# Patient Record
Sex: FEMALE | Race: WHITE | NOT HISPANIC OR LATINO | URBAN - METROPOLITAN AREA
[De-identification: names, ages, dates, MRNs, and addresses within clinical notes are randomized per-mention and may not be internally consistent; named-entity substitution may affect disease eponyms.]

---

## 2020-07-25 ENCOUNTER — TELEPHONE ENCOUNTER (OUTPATIENT)
Dept: URBAN - METROPOLITAN AREA CLINIC 13 | Facility: CLINIC | Age: 35
End: 2020-07-25

## 2020-07-25 RX ORDER — PREDNISONE 10 MG/1
TAKE 2 TABLET DAILY TABLET ORAL
Qty: 60 | Refills: 0 | OUTPATIENT
Start: 2014-05-22 | End: 2014-06-17

## 2020-07-25 RX ORDER — CERTOLIZUMAB PEGOL 200 MG/ML
RECONSTITUTE (LYOPHILIZED POWDER) AND INJECT 400MG  SUBQUTANEOUSLY AT WEEK 0, WEEK 2, AND WEEK 4 INJECTION, SOLUTION SUBCUTANEOUS
Qty: 1 | Refills: 0 | OUTPATIENT
Start: 2012-02-17 | End: 2012-03-30

## 2020-07-25 RX ORDER — CHOLESTYRAMINE 4 G/9G
TAKE 1 PACKET TWICE DAILY BEFORE MEALS POWDER, FOR SUSPENSION ORAL
Qty: 1 | Refills: 2 | OUTPATIENT
Start: 2011-10-26 | End: 2012-01-24

## 2020-07-25 RX ORDER — ALPRAZOLAM 0.5 MG/1
TAKE 1 TABLET 3 TIMES DAILY AS NEEDED TABLET ORAL
Qty: 30 | Refills: 0 | OUTPATIENT
Start: 2012-01-24 | End: 2012-05-11

## 2020-07-25 RX ORDER — POLYETHYLENE GLYCOL 3350, SODIUM SULFATE, SODIUM CHLORIDE, POTASSIUM CHLORIDE, ASCORBIC ACID, SODIUM ASCORBATE 7.5-2.691G
USE AS DIRECTED KIT ORAL
Qty: 1 | Refills: 0 | OUTPATIENT
Start: 2012-12-04 | End: 2013-02-11

## 2020-07-25 RX ORDER — METRONIDAZOLE 500 MG/1
TAKE 1 TABLET 3 TIMES DAILY FOR 14 DAYS TABLET ORAL
Qty: 42 | Refills: 0 | OUTPATIENT
Start: 2013-12-04 | End: 2014-01-27

## 2020-07-25 RX ORDER — PREDNISONE 10 MG/1
TAKE 2 TABLET DAILY TABLET ORAL
Qty: 60 | Refills: 2 | OUTPATIENT
Start: 2014-01-27 | End: 2014-03-31

## 2020-07-25 RX ORDER — METRONIDAZOLE 500 MG/1
TAKE 1 TABLET 3 TIMES DAILY FOR 10 DAYS TABLET ORAL
Qty: 30 | Refills: 0 | OUTPATIENT
Start: 2014-04-02 | End: 2014-06-17

## 2020-07-25 RX ORDER — AZATHIOPRINE 50 1/1
TAKE 1 TABLET DAILY INCREASE TO 2 TABLETS DAILY WHEN INSTRUCTED TABLET ORAL
Qty: 60 | Refills: 1 | OUTPATIENT
Start: 2013-02-11 | End: 2013-04-26

## 2020-07-25 RX ORDER — CYANOCOBALAMIN 1000 UG/ML
INJECT 1ML WEEKLY FOR 4 WEEKS, THEN 1ML ONCE MONTHLY INJECTION INTRAMUSCULAR; SUBCUTANEOUS
Qty: 4 | Refills: 6 | OUTPATIENT
Start: 2011-10-27 | End: 2013-07-22

## 2020-07-25 RX ORDER — POLYETHYLENE GLYCOL 3350, SODIUM SULFATE, SODIUM CHLORIDE, POTASSIUM CHLORIDE, ASCORBIC ACID, SODIUM ASCORBATE 7.5-2.691G
TAKE 32 OZ AS DIRECTED 5:00PM THE EVENING BEFORE AND 6HR PRIOR TO PROCEDURE KIT ORAL
Qty: 1 | Refills: 0 | OUTPATIENT
Start: 2014-07-24 | End: 2014-09-19

## 2020-07-25 RX ORDER — CIPROFLOXACIN HYDROCHLORIDE 500 MG/1
TAKE 1 TABLET WEEKLY TABLET, FILM COATED ORAL
Refills: 0 | OUTPATIENT
Start: 2008-09-26 | End: 2008-10-27

## 2020-07-25 RX ORDER — PRENATAL VIT NO.130/IRON/FOLIC 27MG-0.8MG
TAKE 1 TABLET DAILY TABLET ORAL
Refills: 0 | OUTPATIENT
Start: 2008-10-27 | End: 2010-09-14

## 2020-07-25 RX ORDER — METRONIDAZOLE 500 MG/1
TAKE 1 TABLET 3 TIMES DAILY FOR 10 DAYS TABLET ORAL
Qty: 30 | Refills: 0 | OUTPATIENT
Start: 2014-09-19 | End: 2014-10-06

## 2020-07-25 RX ORDER — CHLORHEXIDINE GLUCONATE 4 %
TAKE 1 TABLET 3 TIMES DAILY LIQUID (ML) TOPICAL
Qty: 90 | Refills: 0 | OUTPATIENT
Start: 2012-11-26 | End: 2012-12-04

## 2020-07-25 RX ORDER — CYANOCOBALAMIN 1000 UG/ML
INJECT 1 ML INTRAMUSCULARLY WEEKLY X4 WEEKS THEN ONCE MONTHLY THEREAFTER INJECTION INTRAMUSCULAR; SUBCUTANEOUS
Qty: 4 | Refills: 6 | OUTPATIENT
Start: 2011-10-27

## 2020-07-25 RX ORDER — METRONIDAZOLE 500 MG/1
TAKE 1 TABLET 3 TIMES DAILY FOR 10 DAYS TABLET ORAL
Qty: 30 | Refills: 0 | OUTPATIENT
Start: 2012-12-04 | End: 2013-02-11

## 2020-07-25 RX ORDER — FOLIC ACID 1 MG/1
TAKE 1 TABLET DAILY TDD:1MG TABLET ORAL
Qty: 30 | Refills: 3 | OUTPATIENT
Start: 2011-10-27 | End: 2012-12-04

## 2020-07-25 RX ORDER — PREDNISONE 10 MG/1
TAKE 2 TABLET DAILY TABLET ORAL
Qty: 60 | Refills: 2 | OUTPATIENT
Start: 2014-06-17 | End: 2014-09-19

## 2020-07-25 RX ORDER — CERTOLIZUMAB PEGOL 200 MG/ML
INJECT 400MG SC AT WEEK 0, 2, AND 4  PFS INJECTION, SOLUTION SUBCUTANEOUS
Qty: 6 | Refills: 0 | OUTPATIENT
Start: 2013-06-11 | End: 2013-07-22

## 2020-07-25 RX ORDER — POTASSIUM CHLORIDE 20 MEQ/1
TAKE 2 TABLETS TODAY THEN 1 TABLET DAILY THEREAFTER TABLET, EXTENDED RELEASE ORAL
Qty: 30 | Refills: 0 | OUTPATIENT
Start: 2013-12-12 | End: 2014-10-06

## 2020-07-25 RX ORDER — HYOSCYAMINE SULFATE 0.12 MG/1
PLACE 1-2 TABLET EVERY 6 HOURS PRN ABD PAIN TABLET, ORALLY DISINTEGRATING ORAL
Qty: 120 | Refills: 1 | OUTPATIENT
Start: 2012-12-04 | End: 2012-12-19

## 2020-07-25 RX ORDER — PREDNISONE 10 MG/1
TAKE 3 TABLET DAILY TABLET ORAL
Qty: 90 | Refills: 0 | OUTPATIENT
Start: 2014-04-10 | End: 2014-05-22

## 2020-07-26 ENCOUNTER — TELEPHONE ENCOUNTER (OUTPATIENT)
Dept: URBAN - METROPOLITAN AREA CLINIC 13 | Facility: CLINIC | Age: 35
End: 2020-07-26

## 2020-07-26 RX ORDER — METRONIDAZOLE 500 MG/1
TAKE 1 TABLET 3 TIMES DAILY X 14 DAYS TABLET ORAL
Qty: 42 | Refills: 0 | Status: ACTIVE | COMMUNITY
Start: 2014-12-31

## 2020-07-26 RX ORDER — METRONIDAZOLE 500 MG/1
TAKE 1 TABLET 3 TIMES DAILY TABLET ORAL
Qty: 30 | Refills: 0 | Status: ACTIVE | COMMUNITY
Start: 2014-11-18

## 2020-07-26 RX ORDER — VEDOLIZUMAB 300 MG/5ML
INFUSE 300MG EVERY 8 WEEKS INJECTION, POWDER, LYOPHILIZED, FOR SOLUTION INTRAVENOUS
Refills: 0 | Status: ACTIVE | COMMUNITY
Start: 2014-09-10

## 2020-07-26 RX ORDER — OXYCODONE AND ACETAMINOPHEN 5; 325 MG/1; MG/1
TAKE 1 TO 2 TABLETS BY MOUTH EVERY 4 TO 6 HOURS AS NEEDED FOR SEVERE P TABLET ORAL
Qty: 60 | Refills: 0 | Status: ACTIVE | COMMUNITY
Start: 2011-09-06

## 2020-07-26 RX ORDER — CIPROFLOXACIN HYDROCHLORIDE 500 MG/1
TAKE 1 TABLET EVERY TWELVE HOURS TABLET, FILM COATED ORAL
Qty: 28 | Refills: 0 | Status: ACTIVE | COMMUNITY
Start: 2014-12-31

## 2020-07-26 RX ORDER — PREDNISONE 10 MG/1
TAKE 2 TABLET DAILY TABLET ORAL
Qty: 60 | Refills: 1 | Status: ACTIVE | COMMUNITY
Start: 2014-10-06

## 2022-10-09 ENCOUNTER — CLAIMS CREATED FROM THE CLAIM WINDOW (OUTPATIENT)
Dept: URBAN - METROPOLITAN AREA MEDICAL CENTER 19 | Facility: MEDICAL CENTER | Age: 37
End: 2022-10-09
Payer: COMMERCIAL

## 2022-10-09 DIAGNOSIS — R74.01 ALT (SGPT) LEVEL RAISED: ICD-10-CM

## 2022-10-09 DIAGNOSIS — R74.8 ABNORMAL ALKALINE PHOSPHATASE TEST: ICD-10-CM

## 2022-10-09 DIAGNOSIS — D64.89 NORMOCYTIC ANEMIA: ICD-10-CM

## 2022-10-09 DIAGNOSIS — K50.114 CROHN'S COLITIS, WITH ABSCESS: ICD-10-CM

## 2022-10-09 PROCEDURE — 99223 1ST HOSP IP/OBS HIGH 75: CPT | Performed by: INTERNAL MEDICINE

## 2022-10-09 PROCEDURE — 99222 1ST HOSP IP/OBS MODERATE 55: CPT | Performed by: INTERNAL MEDICINE

## 2022-10-25 ENCOUNTER — WEB ENCOUNTER (OUTPATIENT)
Dept: URBAN - METROPOLITAN AREA CLINIC 113 | Facility: CLINIC | Age: 37
End: 2022-10-25

## 2022-10-28 ENCOUNTER — OFFICE VISIT (OUTPATIENT)
Dept: URBAN - METROPOLITAN AREA CLINIC 107 | Facility: CLINIC | Age: 37
End: 2022-10-28

## 2022-11-16 ENCOUNTER — OFFICE VISIT (OUTPATIENT)
Dept: URBAN - METROPOLITAN AREA CLINIC 107 | Facility: CLINIC | Age: 37
End: 2022-11-16
Payer: COMMERCIAL

## 2022-11-16 VITALS
DIASTOLIC BLOOD PRESSURE: 78 MMHG | TEMPERATURE: 98.3 F | BODY MASS INDEX: 22.66 KG/M2 | WEIGHT: 136 LBS | HEART RATE: 82 BPM | HEIGHT: 65 IN | SYSTOLIC BLOOD PRESSURE: 117 MMHG | RESPIRATION RATE: 18 BRPM

## 2022-11-16 DIAGNOSIS — N82.3 RECTOVAGINAL FISTULA: ICD-10-CM

## 2022-11-16 DIAGNOSIS — K50.813 CROHN'S DISEASE OF BOTH SMALL AND LARGE INTESTINE WITH FISTULA: ICD-10-CM

## 2022-11-16 DIAGNOSIS — K61.1 PERIRECTAL ABSCESS: ICD-10-CM

## 2022-11-16 DIAGNOSIS — R74.8 ELEVATED LIVER ENZYMES: ICD-10-CM

## 2022-11-16 PROCEDURE — 99214 OFFICE O/P EST MOD 30 MIN: CPT | Performed by: NURSE PRACTITIONER

## 2022-11-16 RX ORDER — HYDROCODONE BITARTRATE AND ACETAMINOPHEN 5; 325 MG/1; MG/1
1 TABLET AS NEEDED TABLET ORAL
Status: ACTIVE | COMMUNITY

## 2022-11-16 RX ORDER — ONDANSETRON HYDROCHLORIDE 4 MG/1
1 TABLET TABLET, FILM COATED ORAL ONCE A DAY
Status: ACTIVE | COMMUNITY

## 2022-11-16 RX ORDER — USTEKINUMAB 90 MG/ML
AS DIRECTED INJECTION, SOLUTION SUBCUTANEOUS
Status: ACTIVE | COMMUNITY

## 2022-11-16 RX ORDER — PREDNISONE 10 MG/1
TAKE 2 TABLET DAILY TABLET ORAL
Qty: 60 | Refills: 1 | Status: ACTIVE | COMMUNITY
Start: 2014-10-06

## 2022-11-16 NOTE — HPI-TODAY'S VISIT:
She was seen in hospital consultation by Dr. Melendez on 10/9/2022 for evaluation of perianal pain secondary to recurrent perirectal abscess.  She had been started on Cipro and Flagyl outpatient, and was awaiting I&D later that afternoon.  Regarding elevated liver enzymes, she was instructed to hold azathioprine.  MRCP was recommended once recovered from surgery, due to concern for PSC.  A liver biopsy was considered.  She was recommended close follow-up with her gastroenterologist, Dr. Smart, upon discharge. She returns today to transfer her care back to our office. She has been following closely with Dr. Bergman since the time of hospital discharge secondary to suspected rectovaginal fistula. She underwent I&D of perirectal abscess with placement of seton drain on 10/9/22 by Dr. Jenkins while inpatient. Due to peristent pain and new vaginal drainage, she had EUA with abscess drainage on 10/28/22 by Dr. Bergman, with seton revision.  She reports little relief from abscess drainage and seton x2 to this point. She will undergo repeat EUA in about two weeks for reevaluation of the anus by Dr. Bergman. She remains on Stelara every 8 weeks and prednisone 5mg daily, but azathioprine has been held since the time of the hospitalization. Her bowel habits are regular and she denies blood per rectum. No abdominal pain. She had a MRI last week at [facility], ordered by Dr. Smart. She has an appointment scheduled with him later today to discuss the results, but is unsure if she will keep the appointment.

## 2022-11-16 NOTE — HPI-TODAY'S VISIT:
Ms. Manzano is a 37 year old woman with a history of stricturing ileocolonic Crohn's disease with perianal fistula status post ileocolectomy with ileosigmoid anastomosis (2011) on Stelara and azathioprine, recurrent perirectal fistulas/abscesses s/p multiple I&Ds, presenting for follow-up after hospitalization. She was previously followed in our office by Dr. Michelle and more recently has been managed by Dr. Smart.

## 2022-11-16 NOTE — HPI-OTHER HISTORIES
CT of the chest abdomen and pelvis with contrast on 11/4/2020 revealed a seton catheter along the left gluteal soft tissues.  Just anterior to the seton catheter is an additional fistulous tract beginning approximately 2:00 extending superiorly to the intersphincteric aspect of the left renal stent and also extending laterally to the posterior aspect of the left perineum as well as approximating the left vaginal wall without discrete intravaginal extension. CT of the abdomen and pelvis with contrast 10/8/2022:Left-sided rim-enhancing fluid collection measuring 3.2 x 3.4 x 3.8 cm concerning for perennial abscess. Per the hospital consult notes, last colonoscopy by Dr. Smart in 2021 showed significant colon inflammation. Colonoscopy August 2014 by Dr. Michelle:inactive perianal fistula/tags found on perianal exam, ileocolonic anastomosis with superficial ulceration, normal neoterminal ileum, colon erythema with small, clean-based rectal ulcerations, anal canal ulcer without stenosis.  Biopsies are not readily available within the chart. Colonoscopy (1/23/13) : fair prep, scattered area of mucosa in chandrika- terminal ileum with mild erythema and erosions, mucosal ulceration in proximal colon, distal rectal ulcerations, patent end-to-side ileo-colonic anastomosis in descending colon with erythema and ulceration. Abnormal perianal exam c/w fistula. Low grade active chronic inflammation with focal erosion on small intestinal biopsies. Severe active chronic colitis with ulceration and inflamed granulation tissue on colon and rectal bx.

## 2022-12-12 ENCOUNTER — OFFICE VISIT (OUTPATIENT)
Dept: URBAN - METROPOLITAN AREA CLINIC 107 | Facility: CLINIC | Age: 37
End: 2022-12-12

## 2023-02-01 ENCOUNTER — OFFICE VISIT (OUTPATIENT)
Dept: URBAN - METROPOLITAN AREA CLINIC 107 | Facility: CLINIC | Age: 38
End: 2023-02-01

## 2023-02-14 ENCOUNTER — WEB ENCOUNTER (OUTPATIENT)
Dept: URBAN - METROPOLITAN AREA CLINIC 107 | Facility: CLINIC | Age: 38
End: 2023-02-14

## 2023-02-14 ENCOUNTER — OFFICE VISIT (OUTPATIENT)
Dept: URBAN - METROPOLITAN AREA CLINIC 107 | Facility: CLINIC | Age: 38
End: 2023-02-14
Payer: COMMERCIAL

## 2023-02-14 VITALS
RESPIRATION RATE: 18 BRPM | TEMPERATURE: 96.9 F | DIASTOLIC BLOOD PRESSURE: 80 MMHG | BODY MASS INDEX: 22.33 KG/M2 | HEART RATE: 78 BPM | HEIGHT: 65 IN | SYSTOLIC BLOOD PRESSURE: 109 MMHG | WEIGHT: 134 LBS

## 2023-02-14 DIAGNOSIS — K61.1 PERIRECTAL ABSCESS: ICD-10-CM

## 2023-02-14 DIAGNOSIS — K50.813 CROHN'S DISEASE OF BOTH SMALL AND LARGE INTESTINE WITH FISTULA: ICD-10-CM

## 2023-02-14 DIAGNOSIS — N82.3 RECTOVAGINAL FISTULA: ICD-10-CM

## 2023-02-14 DIAGNOSIS — R74.8 ELEVATED LIVER ENZYMES: ICD-10-CM

## 2023-02-14 DIAGNOSIS — Z79.899 HIGH RISK MEDICATION USE: ICD-10-CM

## 2023-02-14 PROCEDURE — 99214 OFFICE O/P EST MOD 30 MIN: CPT | Performed by: NURSE PRACTITIONER

## 2023-02-14 RX ORDER — PREDNISONE 10 MG/1
TAKE 2 TABLET DAILY TABLET ORAL
Qty: 60 | Refills: 1 | Status: ACTIVE | COMMUNITY
Start: 2014-10-06

## 2023-02-14 RX ORDER — HYDROCODONE BITARTRATE AND ACETAMINOPHEN 5; 325 MG/1; MG/1
1 TABLET AS NEEDED TABLET ORAL
Status: ACTIVE | COMMUNITY

## 2023-02-14 RX ORDER — USTEKINUMAB 90 MG/ML
AS DIRECTED INJECTION, SOLUTION SUBCUTANEOUS
Status: ACTIVE | COMMUNITY

## 2023-02-14 RX ORDER — PREDNISONE 5 MG/1
1 TABLET TABLET ORAL ONCE A DAY
Qty: 30 | Refills: 2 | OUTPATIENT

## 2023-02-14 RX ORDER — ONDANSETRON HYDROCHLORIDE 4 MG/1
1 TABLET TABLET, FILM COATED ORAL ONCE A DAY
Status: ACTIVE | COMMUNITY

## 2023-02-14 NOTE — HPI-TODAY'S VISIT:
She was seen in the office 11/16/2022 to reestablish care regarding perianal Crohn's disease, and for follow-up after recent hospitalization for recurrent perirectal abscess status post I&D and seton x2.  There was some concern for rectovaginal fistula, though not appreciated at the time of the EUA.  She was awaiting repeat rectal exam under anesthesia with colorectal surgery.  She was to continue her current regimen with Stelara and low-dose prednisone, in the interim.  Regarding elevated liver enzymes, labs were repeated to trend.  Recent MRI/MRCP results were requested for review to evaluate for PSC.  She was to continue to hold azathioprine. She missed her office appointment with Dr. Michelle on 2/1/2023 and returns today for follow-up. Her symptoms are largely unchanged. She underwent repeat rectal exam under anesthesia with Dr. Bergman on 11/30/22. This revealed an abscess in the left lateral anterior anal margin, opened and seton revised. No obvious invasion in to the vagina was identified. Per the patient, watchful waiting has been recommended to see how she responds to her next few Stelara infusions. The option of temporary colostomy was discussed if no improvement.  She continues to have constant green, nonbloody fistulous drainage despite seton revision. She states the drainage is not any worse, but also has not improved. She denies abdominal pain. She has 4-5 loose stools per day, with minimal urgency and rare nocturnal defecation. She denies blood in the stool. She remains on Stelara every 8 weeks and prednisone 5mg per day. Per colorectal surgery consult notes from December, advancement flap is a consideration if drainage is unimproved.

## 2023-02-14 NOTE — HPI-TODAY'S VISIT:
Ms. Manzano is a 38 year old woman with a history of stricturing ileocolonic Crohn's disease with perianal fistula status post ileocolectomy with ileosigmoid anastomosis (2011) on Stelara and azathioprine, recurrent perirectal fistulas/abscesses s/p multiple I&Ds, presenting for follow-up.

## 2023-02-14 NOTE — HPI-OTHER HISTORIES
MRI of the pelvis w/wo contrast 11/10/22: two left perianal fistulas, one of which appears transsphenteric with seton in place, additional perineal fistula on the left which appears intersphincteric which approaches the vaginal wall without definite extension, possible right fistulous tract, 4.7cm right ovarian cyst with possible mild right hydrosalpinx. She was seen in hospital consultation by Dr. Melendez on 10/9/2022 for evaluation of perianal pain secondary to recurrent perirectal abscess.  She had been started on Cipro and Flagyl outpatient, and was awaiting I&D later that afternoon.  Regarding elevated liver enzymes, she was instructed to hold azathioprine.  MRCP was recommended once recovered from surgery, due to concern for PSC.  A liver biopsy was considered.  She was recommended close follow-up with her gastroenterologist, Dr. Smart, upon discharge but elected to transfer her care back to our office. She has been following closely with Dr. Bergman since the time of hospital discharge secondary to suspected rectovaginal fistula. She underwent I&D of perirectal abscess with placement of seton drain on 10/9/22 by Dr. Jenkins while inpatient. Due to peristent pain and new vaginal drainage, she had EUA with abscess drainage on 10/28/22 by Dr. Bergman, with seton revision. CT of the chest abdomen and pelvis with contrast on 11/4/2022 revealed a seton catheter along the left gluteal soft tissues.  Just anterior to the seton catheter is an additional fistulous tract beginning approximately 2:00 extending superiorly to the intersphincteric aspect of the left renal stent and also extending laterally to the posterior aspect of the left perineum as well as approximating the left vaginal wall without discrete intravaginal extension. CT of the abdomen and pelvis with contrast 10/8/2022:Left-sided rim-enhancing fluid collection measuring 3.2 x 3.4 x 3.8 cm concerning for perennial abscess. Per the hospital consult notes, last colonoscopy by Dr. Smart in 2021 showed significant colon inflammation. Colonoscopy August 2014 by Dr. Mcihelle:inactive perianal fistula/tags found on perianal exam, ileocolonic anastomosis with superficial ulceration, normal neoterminal ileum, colon erythema with small, clean-based rectal ulcerations, anal canal ulcer without stenosis.  Biopsies are not readily available within the chart. Colonoscopy (1/23/13) : fair prep, scattered area of mucosa in chandrika- terminal ileum with mild erythema and erosions, mucosal ulceration in proximal colon, distal rectal ulcerations, patent end-to-side ileo-colonic anastomosis in descending colon with erythema and ulceration. Abnormal perianal exam c/w fistula. Low grade active chronic inflammation with focal erosion on small intestinal biopsies. Severe active chronic colitis with ulceration and inflamed granulation tissue on colon and rectal bx.

## 2023-02-16 ENCOUNTER — TELEPHONE ENCOUNTER (OUTPATIENT)
Dept: URBAN - METROPOLITAN AREA CLINIC 113 | Facility: CLINIC | Age: 38
End: 2023-02-16

## 2023-02-16 PROBLEM — 1085821000119102 FISTULA OF INTESTINE DUE TO CROHN'S DISEASE OF SMALL AND LARGE INTESTINE: Status: ACTIVE | Noted: 2022-11-16

## 2023-02-16 PROBLEM — 65619001: Status: ACTIVE | Noted: 2022-11-16

## 2023-03-03 ENCOUNTER — TELEPHONE ENCOUNTER (OUTPATIENT)
Dept: URBAN - METROPOLITAN AREA CLINIC 113 | Facility: CLINIC | Age: 38
End: 2023-03-03

## 2023-03-03 RX ORDER — PREDNISONE 5 MG/1
1 TABLET TABLET ORAL ONCE A DAY
Qty: 30 | Refills: 2 | Status: ACTIVE | COMMUNITY

## 2023-03-03 RX ORDER — CHOLESTYRAMINE 4 G/9G
1 SCOOP POWDER, FOR SUSPENSION ORAL TWICE DAILY
Qty: 378 GRAM | Refills: 3 | OUTPATIENT
Start: 2023-03-03

## 2023-03-03 RX ORDER — HYDROCODONE BITARTRATE AND ACETAMINOPHEN 5; 325 MG/1; MG/1
1 TABLET AS NEEDED TABLET ORAL
Status: ACTIVE | COMMUNITY

## 2023-03-03 RX ORDER — USTEKINUMAB 90 MG/ML
AS DIRECTED INJECTION, SOLUTION SUBCUTANEOUS
Status: ACTIVE | COMMUNITY

## 2023-03-03 RX ORDER — PREDNISONE 10 MG/1
TAKE 2 TABLET DAILY TABLET ORAL
Qty: 60 | Refills: 1 | Status: ACTIVE | COMMUNITY
Start: 2014-10-06

## 2023-03-03 RX ORDER — ONDANSETRON HYDROCHLORIDE 4 MG/1
1 TABLET TABLET, FILM COATED ORAL ONCE A DAY
Status: ACTIVE | COMMUNITY

## 2023-04-20 ENCOUNTER — OFFICE VISIT (OUTPATIENT)
Dept: URBAN - METROPOLITAN AREA CLINIC 107 | Facility: CLINIC | Age: 38
End: 2023-04-20
Payer: COMMERCIAL

## 2023-04-20 ENCOUNTER — TELEPHONE ENCOUNTER (OUTPATIENT)
Dept: URBAN - METROPOLITAN AREA CLINIC 113 | Facility: CLINIC | Age: 38
End: 2023-04-20

## 2023-04-20 VITALS
DIASTOLIC BLOOD PRESSURE: 74 MMHG | RESPIRATION RATE: 18 BRPM | HEART RATE: 73 BPM | HEIGHT: 65 IN | BODY MASS INDEX: 21.89 KG/M2 | TEMPERATURE: 98 F | SYSTOLIC BLOOD PRESSURE: 103 MMHG | WEIGHT: 131.4 LBS

## 2023-04-20 DIAGNOSIS — N82.3 RECTOVAGINAL FISTULA: ICD-10-CM

## 2023-04-20 DIAGNOSIS — Z79.899 HIGH RISK MEDICATION USE: ICD-10-CM

## 2023-04-20 DIAGNOSIS — R79.89 ELEVATED LFTS: ICD-10-CM

## 2023-04-20 DIAGNOSIS — K50.813 CROHN'S DISEASE OF BOTH SMALL AND LARGE INTESTINE WITH FISTULA: ICD-10-CM

## 2023-04-20 PROBLEM — 863927004: Status: ACTIVE | Noted: 2023-04-20

## 2023-04-20 PROCEDURE — 99214 OFFICE O/P EST MOD 30 MIN: CPT | Performed by: INTERNAL MEDICINE

## 2023-04-20 RX ORDER — HYDROCODONE BITARTRATE AND ACETAMINOPHEN 5; 325 MG/1; MG/1
1 TABLET AS NEEDED TABLET ORAL
Status: DISCONTINUED | COMMUNITY

## 2023-04-20 RX ORDER — CHOLESTYRAMINE 4 G/9G
1 SCOOP POWDER, FOR SUSPENSION ORAL TWICE DAILY
Qty: 378 GRAM | Refills: 3 | Status: ACTIVE | COMMUNITY
Start: 2023-03-03

## 2023-04-20 RX ORDER — PREDNISONE 5 MG/1
1 TABLET TABLET ORAL ONCE A DAY
Qty: 30 | Refills: 2 | Status: ACTIVE | COMMUNITY

## 2023-04-20 RX ORDER — CIPROFLOXACIN HYDROCHLORIDE 500 MG/1
1 TABLET TABLET, FILM COATED ORAL
Qty: 28 TABLET | Refills: 1 | OUTPATIENT
Start: 2023-04-20 | End: 2023-05-18

## 2023-04-20 RX ORDER — PREDNISONE 5 MG/1
1 TABLET TABLET ORAL ONCE A DAY
OUTPATIENT

## 2023-04-20 RX ORDER — ONDANSETRON HYDROCHLORIDE 4 MG/1
1 TABLET TABLET, FILM COATED ORAL ONCE A DAY
Status: DISCONTINUED | COMMUNITY

## 2023-04-20 RX ORDER — USTEKINUMAB 90 MG/ML
AS DIRECTED INJECTION, SOLUTION SUBCUTANEOUS
Status: ACTIVE | COMMUNITY

## 2023-04-20 RX ORDER — METRONIDAZOLE 500 MG/1
1 TABLET TABLET ORAL THREE TIMES A DAY
Qty: 42 TABLET | Refills: 1 | OUTPATIENT
Start: 2023-04-20 | End: 2023-05-18

## 2023-04-20 RX ORDER — USTEKINUMAB 90 MG/ML
AS DIRECTED INJECTION, SOLUTION SUBCUTANEOUS
OUTPATIENT

## 2023-04-20 RX ORDER — PREDNISONE 10 MG/1
TAKE 2 TABLET DAILY TABLET ORAL
Qty: 60 | Refills: 1 | Status: DISCONTINUED | COMMUNITY
Start: 2014-10-06

## 2023-04-20 NOTE — HPI-OTHER HISTORIES
GLENNA with Dr. Bergman on 11/30/22: abscess in the left lateral anterior anal margin, opened and seton revised. No obvious invasion in to the vagina was identified.   MRI of the pelvis w/wo contrast 11/10/22: two left perianal fistulas, one of which appears transsphenteric with seton in place, additional perineal fistula on the left which appears intersphincteric which approaches the vaginal wall without definite extension, possible right fistulous tract, 4.7cm right ovarian cyst with possible mild right hydrosalpinx.  CT chest abdomen and pelvis with contrast on 11/4/2022 revealed a seton catheter along the left gluteal soft tissues.  Just anterior to the seton catheter is an additional fistulous tract beginning approximately 2:00 extending superiorly to the intersphincteric aspect of the left renal stent and also extending laterally to the posterior aspect of the left perineum as well as approximating the left vaginal wall without discrete intravaginal extension. CT of the abdomen and pelvis with contrast 10/8/2022:Left-sided rim-enhancing fluid collection measuring 3.2 x 3.4 x 3.8 cm concerning for perennial abscess. Per the hospital consult notes, last colonoscopy by Dr. Smart in 2021 showed significant colon inflammation. Colonoscopy August 2014 by Dr. Michelle:inactive perianal fistula/tags found on perianal exam, ileocolonic anastomosis with superficial ulceration, normal neoterminal ileum, colon erythema with small, clean-based rectal ulcerations, anal canal ulcer without stenosis.  Biopsies are not readily available within the chart. Colonoscopy (1/23/13) : fair prep, scattered area of mucosa in chandrika- terminal ileum with mild erythema and erosions, mucosal ulceration in proximal colon, distal rectal ulcerations, patent end-to-side ileo-colonic anastomosis in descending colon with erythema and ulceration. Abnormal perianal exam c/w fistula. Low grade active chronic inflammation with focal erosion on small intestinal biopsies. Severe active chronic colitis with ulceration and inflamed granulation tissue on colon and rectal bx.

## 2023-04-20 NOTE — HPI-TODAY'S VISIT:
Ms. Manzano is a 38 year old woman with a history of stricturing ileocolonic Crohn's disease with perianal fistula status post ileocolectomy with ileosigmoid anastomosis (2011) and setons on Stelara presentinf for follow up.  She was last seen on 2/14/2023 for follow-up regarding small bowel and colonic Crohn's disease with significant perianal disease requiring seton placement and drainage of abscess with questionable rectovaginal fistula on Stelara 90 mg every 8 weeks and prednisone daily.  Recommendations were to obtain trough ustekinumab level and antibody.    The ustekinumab level on 3/16/2023 was 4.9 mcg/mL with no detectable anti-ustekinumab antibody.  She reports having 4-5 bowel movements daily with some urgency that also contained some blood and mucus.  Her last Stelara infusion was on 3/17/2023.  She is taking the Stelara every 8 weeks along with prednisone 5 mg daily.  She has been on Stelara for 3 to 4 years.  She has quit smoking.  She also takes cholestyramine 4 g every other day.  She denies any fevers.  She has not been on any antibiotics.  She has heartburn that is intermittent and controlled with an over-the-counter antiacid.  No dysphagia

## 2023-05-04 ENCOUNTER — LAB OUTSIDE AN ENCOUNTER (OUTPATIENT)
Dept: URBAN - METROPOLITAN AREA CLINIC 107 | Facility: CLINIC | Age: 38
End: 2023-05-04

## 2023-05-04 PROBLEM — 453861000124107: Status: ACTIVE | Noted: 2023-04-20

## 2023-05-15 ENCOUNTER — WEB ENCOUNTER (OUTPATIENT)
Dept: URBAN - METROPOLITAN AREA CLINIC 107 | Facility: CLINIC | Age: 38
End: 2023-05-15

## 2023-05-15 ENCOUNTER — TELEPHONE ENCOUNTER (OUTPATIENT)
Dept: URBAN - METROPOLITAN AREA CLINIC 113 | Facility: CLINIC | Age: 38
End: 2023-05-15

## 2023-05-15 RX ORDER — PREDNISONE 5 MG/1
1 TABLET TABLET ORAL ONCE A DAY
Qty: 90 | Refills: 0
End: 2023-08-13

## 2023-05-15 RX ORDER — PREDNISONE 5 MG/1
1 TABLET TABLET ORAL ONCE A DAY
Qty: 30 | Refills: 1

## 2023-06-21 ENCOUNTER — WEB ENCOUNTER (OUTPATIENT)
Dept: URBAN - METROPOLITAN AREA CLINIC 107 | Facility: CLINIC | Age: 38
End: 2023-06-21

## 2023-06-21 ENCOUNTER — OFFICE VISIT (OUTPATIENT)
Dept: URBAN - METROPOLITAN AREA CLINIC 107 | Facility: CLINIC | Age: 38
End: 2023-06-21
Payer: COMMERCIAL

## 2023-06-21 VITALS
RESPIRATION RATE: 18 BRPM | WEIGHT: 134.6 LBS | TEMPERATURE: 97.7 F | BODY MASS INDEX: 22.42 KG/M2 | DIASTOLIC BLOOD PRESSURE: 70 MMHG | HEART RATE: 62 BPM | SYSTOLIC BLOOD PRESSURE: 111 MMHG | HEIGHT: 65 IN

## 2023-06-21 DIAGNOSIS — N82.3 RECTOVAGINAL FISTULA: ICD-10-CM

## 2023-06-21 DIAGNOSIS — K50.813 CROHN'S DISEASE OF BOTH SMALL AND LARGE INTESTINE WITH FISTULA: ICD-10-CM

## 2023-06-21 PROCEDURE — 99214 OFFICE O/P EST MOD 30 MIN: CPT | Performed by: INTERNAL MEDICINE

## 2023-06-21 RX ORDER — CHOLESTYRAMINE 4 G/9G
1 SCOOP POWDER, FOR SUSPENSION ORAL TWICE DAILY
Qty: 378 GRAM | Refills: 3 | Status: ACTIVE | COMMUNITY
Start: 2023-03-03

## 2023-06-21 RX ORDER — USTEKINUMAB 90 MG/ML
AS DIRECTED INJECTION, SOLUTION SUBCUTANEOUS
OUTPATIENT

## 2023-06-21 RX ORDER — PREDNISONE 5 MG/1
1 TABLET TABLET ORAL ONCE A DAY
Qty: 30 | Refills: 1 | Status: ACTIVE | COMMUNITY

## 2023-06-21 RX ORDER — PREDNISONE 5 MG/1
1 TABLET TABLET ORAL ONCE A DAY
OUTPATIENT

## 2023-06-21 RX ORDER — USTEKINUMAB 90 MG/ML
AS DIRECTED INJECTION, SOLUTION SUBCUTANEOUS
Status: ACTIVE | COMMUNITY

## 2023-06-21 NOTE — HPI-TODAY'S VISIT:
Ms. Manzano is a 38 year old woman with a history of stricturing ileocolonic Crohn's disease with perianal fistula status post ileocolectomy with ileosigmoid anastomosis (2011) and setons on Stelara presenting for follow up.  She was last seen on 4/20/2023 for follow-up regarding small bowel, colonic and perianal Crohn's disease status post multiple resections with ileosigmoid anastomosis and perianal fistula along with a colovaginal fistula on Stelara 90 mg every 8 weeks and prednisone 5 mg daily.  Her Stelara level was at goal (trough) with no evidence of antiustekinumab antibodies.  She reports improvement with a course of ciprofloxacin and Flagyl for perianal symptoms.  Her interval history is notable for removal of her seton by Dr. Bergman.  The plan at the last appointment was to obtain a DEXA scan and perform an MRI/MRCP to evaluate a history of elevated liver enzymes.  Neither the DEXA scan or MRI were performed.  She reports that she did not get called by the hospital to schedule either procedure.  She reports overall doing fairly well.  Her perianal seepage has decreased and her stool frequency has decreased.  She is unable to wean the prednisone below 5 mg daily without an increase in her symptoms.  She typically has 9-10 bowel movements per day without incontinence.  She has some mucus but no blood in her stool.  She typically will have 1-2 bowel movements overnight.  She denies any fevers or weight loss.  No NSAID use.

## 2023-07-10 ENCOUNTER — LAB OUTSIDE AN ENCOUNTER (OUTPATIENT)
Dept: URBAN - METROPOLITAN AREA CLINIC 107 | Facility: CLINIC | Age: 38
End: 2023-07-10

## 2023-07-20 ENCOUNTER — TELEPHONE ENCOUNTER (OUTPATIENT)
Dept: URBAN - METROPOLITAN AREA CLINIC 113 | Facility: CLINIC | Age: 38
End: 2023-07-20

## 2023-07-20 RX ORDER — POLYETHYLENE GLYCOL 3350, SODIUM CHLORIDE, SODIUM BICARBONATE, POTASSIUM CHLORIDE 420; 11.2; 5.72; 1.48 G/4L; G/4L; G/4L; G/4L
1/2 PREP (2000ML) @ 5PM DAY BEFORE AND `/2 PREP IS 6 HOURS PRIOR POWDER, FOR SOLUTION ORAL
Qty: 2000 ML | Refills: 0 | OUTPATIENT
Start: 2023-07-20 | End: 2023-08-19

## 2023-07-24 ENCOUNTER — TELEPHONE ENCOUNTER (OUTPATIENT)
Dept: URBAN - METROPOLITAN AREA CLINIC 113 | Facility: CLINIC | Age: 38
End: 2023-07-24

## 2023-07-24 RX ORDER — PREDNISONE 5 MG/1
AS DIRECTED TABLET ORAL ONCE A DAY
Qty: 90 | Refills: 1

## 2023-08-07 ENCOUNTER — TELEPHONE ENCOUNTER (OUTPATIENT)
Dept: URBAN - METROPOLITAN AREA CLINIC 113 | Facility: CLINIC | Age: 38
End: 2023-08-07

## 2023-08-24 ENCOUNTER — OUT OF OFFICE VISIT (OUTPATIENT)
Dept: URBAN - METROPOLITAN AREA SURGERY CENTER 25 | Facility: SURGERY CENTER | Age: 38
End: 2023-08-24
Payer: COMMERCIAL

## 2023-08-24 ENCOUNTER — CLAIMS CREATED FROM THE CLAIM WINDOW (OUTPATIENT)
Dept: URBAN - METROPOLITAN AREA CLINIC 4 | Facility: CLINIC | Age: 38
End: 2023-08-24
Payer: COMMERCIAL

## 2023-08-24 DIAGNOSIS — K50.80 CROHN'S DISEASE OF BOTH SMALL AND LARGE INTESTINE WITHOUT COMPLICATION: ICD-10-CM

## 2023-08-24 DIAGNOSIS — K62.89 OTHER SPECIFIED DISEASES OF ANUS AND RECTUM: ICD-10-CM

## 2023-08-24 DIAGNOSIS — K60.3 ANAL FISTULA: ICD-10-CM

## 2023-08-24 DIAGNOSIS — K63.3 APHTHOUS ULCER OF COLON: ICD-10-CM

## 2023-08-24 DIAGNOSIS — K50.80 CROHN'S COLITIS: ICD-10-CM

## 2023-08-24 DIAGNOSIS — K64.4 RESIDUAL HEMORRHOIDAL SKIN TAGS: ICD-10-CM

## 2023-08-24 DIAGNOSIS — K63.3 ULCER OF INTESTINE: ICD-10-CM

## 2023-08-24 DIAGNOSIS — K63.89 APPENDICITIS EPIPLOICA: ICD-10-CM

## 2023-08-24 PROCEDURE — 00811 ANES LWR INTST NDSC NOS: CPT | Performed by: ANESTHESIOLOGIST ASSISTANT

## 2023-08-24 PROCEDURE — 45380 COLONOSCOPY AND BIOPSY: CPT | Performed by: INTERNAL MEDICINE

## 2023-08-24 PROCEDURE — G8907 PT DOC NO EVENTS ON DISCHARG: HCPCS | Performed by: INTERNAL MEDICINE

## 2023-08-24 PROCEDURE — 00811 ANES LWR INTST NDSC NOS: CPT | Performed by: ANESTHESIOLOGY

## 2023-08-24 PROCEDURE — 88305 TISSUE EXAM BY PATHOLOGIST: CPT | Performed by: PATHOLOGY

## 2023-08-24 PROCEDURE — 88341 IMHCHEM/IMCYTCHM EA ADD ANTB: CPT | Performed by: PATHOLOGY

## 2023-08-24 PROCEDURE — 88342 IMHCHEM/IMCYTCHM 1ST ANTB: CPT | Performed by: PATHOLOGY

## 2023-10-24 ENCOUNTER — TELEPHONE ENCOUNTER (OUTPATIENT)
Dept: URBAN - METROPOLITAN AREA CLINIC 113 | Facility: CLINIC | Age: 38
End: 2023-10-24

## 2023-10-24 RX ORDER — CHOLESTYRAMINE 4 G/9G
1 PACKET MIXED WITH WATER OR NON-CARBONATED DRINK POWDER, FOR SUSPENSION ORAL TWICE DAILY
Qty: 60 PACKET | Refills: 5
Start: 2023-03-03

## 2023-10-31 ENCOUNTER — OFFICE VISIT (OUTPATIENT)
Dept: URBAN - METROPOLITAN AREA CLINIC 113 | Facility: CLINIC | Age: 38
End: 2023-10-31
Payer: COMMERCIAL

## 2023-10-31 ENCOUNTER — LAB OUTSIDE AN ENCOUNTER (OUTPATIENT)
Dept: URBAN - METROPOLITAN AREA CLINIC 113 | Facility: CLINIC | Age: 38
End: 2023-10-31

## 2023-10-31 VITALS
BODY MASS INDEX: 22.86 KG/M2 | WEIGHT: 137.2 LBS | DIASTOLIC BLOOD PRESSURE: 60 MMHG | TEMPERATURE: 97.8 F | SYSTOLIC BLOOD PRESSURE: 112 MMHG | HEART RATE: 54 BPM | HEIGHT: 65 IN

## 2023-10-31 DIAGNOSIS — N82.3 RECTOVAGINAL FISTULA: ICD-10-CM

## 2023-10-31 DIAGNOSIS — R79.89 ELEVATED LFTS: ICD-10-CM

## 2023-10-31 DIAGNOSIS — K50.813 CROHN'S DISEASE OF BOTH SMALL AND LARGE INTESTINE WITH FISTULA: ICD-10-CM

## 2023-10-31 DIAGNOSIS — Z79.899 HIGH RISK MEDICATION USE: ICD-10-CM

## 2023-10-31 PROCEDURE — 99213 OFFICE O/P EST LOW 20 MIN: CPT | Performed by: NURSE PRACTITIONER

## 2023-10-31 RX ORDER — USTEKINUMAB 90 MG/ML
AS DIRECTED INJECTION, SOLUTION SUBCUTANEOUS
OUTPATIENT

## 2023-10-31 RX ORDER — CHOLESTYRAMINE 4 G/9G
1 PACKET MIXED WITH WATER OR NON-CARBONATED DRINK POWDER, FOR SUSPENSION ORAL TWICE DAILY
Qty: 60 PACKET | Refills: 5 | Status: ACTIVE | COMMUNITY
Start: 2023-03-03

## 2023-10-31 RX ORDER — PREDNISONE 5 MG/1
1 TABLET TABLET ORAL ONCE A DAY
OUTPATIENT

## 2023-10-31 RX ORDER — PREDNISONE 5 MG/1
AS DIRECTED TABLET ORAL ONCE A DAY
Qty: 90 | Refills: 1 | Status: ACTIVE | COMMUNITY

## 2023-10-31 RX ORDER — USTEKINUMAB 90 MG/ML
AS DIRECTED INJECTION, SOLUTION SUBCUTANEOUS
Status: ACTIVE | COMMUNITY

## 2023-10-31 NOTE — HPI-TODAY'S VISIT:
37 yo woman with a history of stricturing ileocolonic Crohn's disease with perianal fistula status post ileocolectomy with ileosigmoid anastomosis (2011) and setons on Stelara presenting for follow up after a colonoscopy.  She was seen in the office 6/21/23 with persistent symptoms of perianal seepage, unable to wean below prednisone 5 mg daily without increasing symptoms. She described 9 or 10 bowel movements daily with mucus, but no blood per rectum She admitted 1-2 nocturnal stools. DEXA scan showed osteopenia. A colonoscopy 8/24/23 revealed an anal fissure, perianal fistula and perianal skin tags.  There was a non-patent end to side ileocolonic anastomosis, characterized by ulceration, edema and severe stenosis s/p biopsies. There is altered vascular mucosa in the rectum s/p biopsies. Pathology showed chronic active enterocolitis in the transverse colon/ ileocolonic anastomosis.  She continues with symptoms, which vary on a day to day basis. This morning, she is experiencing increased bowel sounds. She will have about 5 to 6 stools per day. She is using cholestyramine 4 g, 1 packet twice daily. She continues with mucus per rectum. No blood per rectum. She continues to wake at night for bowel movements. She is taking Stelara 90 mg every 8 weeks. She is on prednisone 5 mg each day. There is occasional abdominal pain. No nausea or vomiting. Her appetite is fair. Her weight is stable. There is mild hearturn, for which she takes an over the counter famotidine. No dysphagia.

## 2023-11-06 ENCOUNTER — TELEPHONE ENCOUNTER (OUTPATIENT)
Dept: URBAN - METROPOLITAN AREA CLINIC 113 | Facility: CLINIC | Age: 38
End: 2023-11-06

## 2024-01-16 ENCOUNTER — OFFICE VISIT (OUTPATIENT)
Dept: URBAN - METROPOLITAN AREA CLINIC 113 | Facility: CLINIC | Age: 39
End: 2024-01-16
Payer: COMMERCIAL

## 2024-01-16 VITALS
TEMPERATURE: 97.2 F | SYSTOLIC BLOOD PRESSURE: 104 MMHG | WEIGHT: 138.2 LBS | HEIGHT: 65 IN | DIASTOLIC BLOOD PRESSURE: 75 MMHG | BODY MASS INDEX: 23.03 KG/M2 | HEART RATE: 62 BPM | RESPIRATION RATE: 18 BRPM

## 2024-01-16 DIAGNOSIS — R79.89 ELEVATED LFTS: ICD-10-CM

## 2024-01-16 DIAGNOSIS — N82.3 RECTOVAGINAL FISTULA: ICD-10-CM

## 2024-01-16 DIAGNOSIS — K90.89 BILE SALT-INDUCED DIARRHEA: ICD-10-CM

## 2024-01-16 DIAGNOSIS — K50.813 CROHN'S DISEASE OF BOTH SMALL AND LARGE INTESTINE WITH FISTULA: ICD-10-CM

## 2024-01-16 DIAGNOSIS — Z79.899 HIGH RISK MEDICATION USE: ICD-10-CM

## 2024-01-16 PROCEDURE — 99214 OFFICE O/P EST MOD 30 MIN: CPT | Performed by: INTERNAL MEDICINE

## 2024-01-16 RX ORDER — USTEKINUMAB 90 MG/ML: AS DIRECTED INJECTION, SOLUTION SUBCUTANEOUS

## 2024-01-16 RX ORDER — CHOLESTYRAMINE 4 G/9G
1 PACKET MIXED WITH WATER OR NON-CARBONATED DRINK POWDER, FOR SUSPENSION ORAL TWICE DAILY
OUTPATIENT
Start: 2023-03-03

## 2024-01-16 RX ORDER — CHOLESTYRAMINE 4 G/9G
1 PACKET MIXED WITH WATER OR NON-CARBONATED DRINK POWDER, FOR SUSPENSION ORAL TWICE DAILY
Qty: 60 PACKET | Refills: 5 | Status: ACTIVE | COMMUNITY
Start: 2023-03-03

## 2024-01-16 RX ORDER — PREDNISONE 5 MG/1
1 TABLET TABLET ORAL ONCE A DAY
Status: ACTIVE | COMMUNITY

## 2024-01-16 RX ORDER — RISANKIZUMAB-RZAA 60 MG/ML
AS DIRECTED INJECTION INTRAVENOUS
Qty: 600 | Refills: 0 | OUTPATIENT
Start: 2024-02-01

## 2024-01-16 RX ORDER — PREDNISONE 5 MG/1
2 TABLETS TABLET ORAL ONCE A DAY
Qty: 60 TABLET | Refills: 1

## 2024-01-16 RX ORDER — RISANKIZUMAB-RZAA 360 MG/2.4
AT WEEK 12 WEARABLE INJECTOR SUBCUTANEOUS
Qty: 360 | Refills: 0 | OUTPATIENT
Start: 2024-02-01

## 2024-01-16 RX ORDER — CIPROFLOXACIN 500 MG/1
1 TABLET TABLET, FILM COATED ORAL TWICE A DAY
Qty: 28 TABLET | Refills: 1 | OUTPATIENT
Start: 2024-01-18 | End: 2024-02-15

## 2024-01-16 RX ORDER — METRONIDAZOLE 500 MG/1
1 TABLET TABLET ORAL THREE TIMES A DAY
Qty: 42 TABLET | Refills: 1 | OUTPATIENT
Start: 2024-01-18 | End: 2024-02-15

## 2024-01-16 RX ORDER — USTEKINUMAB 90 MG/ML
AS DIRECTED INJECTION, SOLUTION SUBCUTANEOUS
Status: ACTIVE | COMMUNITY

## 2024-01-16 NOTE — HPI-TODAY'S VISIT:
Ms. Manzano is a 39-year old woman with a history of stricturing ileocolonic Crohn's disease with perianal fistula status post ileocolectomy with ileosigmoid anastomosis (2011) and setons on Stelara 90 mg every 8 weeks presenting for follow up.  She was last seen in the office on 10/31/2023 for follow-up regarding her history of small bowel and colonic Crohn's disease complicated by perianal fistula, rectovaginal fistula on Stelara 90 mg every 8 weeks and prednisone 5 mg daily.  At that visit she was recommended to undergo MRI of the abdomen with and without contrast/MRCP to evaluate her history of elevated liver enzymes, and routine labs were obtained.The MRI abdomen with and without contrast/MRCP (12/23/2023) showed no choledocholithiasis status postcholecystectomy with expected prominence of the common bile duct, no liver lesions.Labs (10/31/2023): TB QuantiFERON gold plus negative, normal CBC, normal basic metabolic panel, normal liver function tests.DEXA scan (7/26/2023) low bone mass consistent with osteopenia, lumbar spine T-score -1.4, Z-score -1.4, left hip T-score -1.3, Z-score -1.1, left femoral neck T-score -2.1, Z-score -1.7.An ustekinumab trough level on 3/16/2023 was 4.9, and antiustekinumab antibodies <10.  She continues to have perianal pain and diarrhea up to 6 times per day with urgency, but no incontinence and no nocturnal episodes of diarrhea.  She takes cholestyramine 4 g daily with some improvement in the diarrhea.  For the last week she is increased her prednisone from 5 mg to 10 mg daily due to issues of abdominal pain and diarrhea.  She denies any red blood per rectum, mucus in her stool, recent antibiotics.  She continues to still have vaginal discharge she attributes to her fistula.  No fevers.  She denies any nausea, vomiting, heartburn or difficulty swallowing.  She believes that previous treatment with ciprofloxacin and Flagyl in the past was helpful.

## 2024-01-16 NOTE — HPI-OTHER HISTORIES
Colonoscopy (8/24/23): notable for anal fissure, perianal fistula and perianal skin tags, non-patent end to side ileocolonic anastomosis, characterized by ulceration, edema and severe stenosis s/p biopsies,  altered vascular mucosa in the rectum s/p biopsies. Pathology showed chronic active enterocolitis in the transverse colon/ ileocolonic anastomosis.  EUA with Dr. Bergman on 11/30/22: abscess in the left lateral anterior anal margin, opened and seton revised. No obvious invasion in to the vagina was identified.   MRI of the pelvis w/wo contrast 11/10/22: two left perianal fistulas, one of which appears transsphenteric with seton in place, additional perineal fistula on the left which appears intersphincteric which approaches the vaginal wall without definite extension, possible right fistulous tract, 4.7cm right ovarian cyst with possible mild right hydrosalpinx.  CT chest abdomen and pelvis with contrast on 11/4/2022 revealed a seton catheter along the left gluteal soft tissues.  Just anterior to the seton catheter is an additional fistulous tract beginning approximately 2:00 extending superiorly to the intersphincteric aspect of the left renal stent and also extending laterally to the posterior aspect of the left perineum as well as approximating the left vaginal wall without discrete intravaginal extension. CT of the abdomen and pelvis with contrast 10/8/2022:Left-sided rim-enhancing fluid collection measuring 3.2 x 3.4 x 3.8 cm concerning for perennial abscess. Per the hospital consult notes, last colonoscopy by Dr. Smart in 2021 showed significant colon inflammation. Colonoscopy August 2014 by Dr. Michelle:inactive perianal fistula/tags found on perianal exam, ileocolonic anastomosis with superficial ulceration, normal neoterminal ileum, colon erythema with small, clean-based rectal ulcerations, anal canal ulcer without stenosis.  Biopsies are not readily available within the chart. Colonoscopy (1/23/13) : fair prep, scattered area of mucosa in chandrika- terminal ileum with mild erythema and erosions, mucosal ulceration in proximal colon, distal rectal ulcerations, patent end-to-side ileo-colonic anastomosis in descending colon with erythema and ulceration. Abnormal perianal exam c/w fistula. Low grade active chronic inflammation with focal erosion on small intestinal biopsies. Severe active chronic colitis with ulceration and inflamed granulation tissue on colon and rectal bx.

## 2024-01-18 PROBLEM — 69980003: Status: ACTIVE | Noted: 2024-01-18

## 2024-02-01 PROBLEM — 62315008: Status: ACTIVE | Noted: 2024-02-01

## 2024-03-21 ENCOUNTER — OV EP (OUTPATIENT)
Dept: URBAN - METROPOLITAN AREA CLINIC 107 | Facility: CLINIC | Age: 39
End: 2024-03-21

## 2024-03-21 VITALS — HEIGHT: 65 IN

## 2024-03-21 RX ORDER — CHOLESTYRAMINE 4 G/9G
1 PACKET MIXED WITH WATER OR NON-CARBONATED DRINK POWDER, FOR SUSPENSION ORAL TWICE DAILY
Status: ACTIVE | COMMUNITY
Start: 2023-03-03

## 2024-03-21 RX ORDER — PREDNISONE 5 MG/1
2 TABLETS TABLET ORAL ONCE A DAY
Qty: 60 TABLET | Refills: 1 | Status: ACTIVE | COMMUNITY

## 2024-03-21 RX ORDER — RISANKIZUMAB-RZAA 60 MG/ML
AS DIRECTED INJECTION INTRAVENOUS
Qty: 600 | Refills: 0 | Status: ACTIVE | COMMUNITY
Start: 2024-02-01

## 2024-03-21 RX ORDER — USTEKINUMAB 90 MG/ML
AS DIRECTED INJECTION, SOLUTION SUBCUTANEOUS
Status: ACTIVE | COMMUNITY

## 2024-03-21 RX ORDER — RISANKIZUMAB-RZAA 360 MG/2.4
AT WEEK 12 WEARABLE INJECTOR SUBCUTANEOUS
Qty: 360 | Refills: 0 | Status: ACTIVE | COMMUNITY
Start: 2024-02-01

## 2024-05-02 ENCOUNTER — TELEPHONE ENCOUNTER (OUTPATIENT)
Dept: URBAN - METROPOLITAN AREA CLINIC 113 | Facility: CLINIC | Age: 39
End: 2024-05-02

## 2024-05-02 ENCOUNTER — OFFICE VISIT (OUTPATIENT)
Dept: URBAN - METROPOLITAN AREA CLINIC 112 | Facility: CLINIC | Age: 39
End: 2024-05-02
Payer: COMMERCIAL

## 2024-05-02 VITALS
RESPIRATION RATE: 16 BRPM | HEIGHT: 65 IN | HEART RATE: 60 BPM | DIASTOLIC BLOOD PRESSURE: 80 MMHG | BODY MASS INDEX: 23.66 KG/M2 | SYSTOLIC BLOOD PRESSURE: 119 MMHG | WEIGHT: 142 LBS | TEMPERATURE: 97.3 F

## 2024-05-02 DIAGNOSIS — K50.813 CROHN'S DISEASE OF BOTH SMALL AND LARGE INTESTINE WITH FISTULA: ICD-10-CM

## 2024-05-02 PROCEDURE — 96413 CHEMO IV INFUSION 1 HR: CPT | Performed by: INTERNAL MEDICINE

## 2024-05-02 RX ORDER — CHOLESTYRAMINE 4 G/9G
1 PACKET MIXED WITH WATER OR NON-CARBONATED DRINK POWDER, FOR SUSPENSION ORAL TWICE DAILY
Status: ACTIVE | COMMUNITY
Start: 2023-03-03

## 2024-05-02 RX ORDER — PREDNISONE 5 MG/1
2 TABLETS TABLET ORAL ONCE A DAY
Status: ACTIVE | COMMUNITY

## 2024-05-02 RX ORDER — RISANKIZUMAB-RZAA 60 MG/ML
AS DIRECTED INJECTION INTRAVENOUS
Qty: 600 | Refills: 0 | Status: ACTIVE | COMMUNITY
Start: 2024-02-01

## 2024-05-02 RX ORDER — RISANKIZUMAB-RZAA 360 MG/2.4
AT WEEK 12 WEARABLE INJECTOR SUBCUTANEOUS
Qty: 360 | Refills: 0 | Status: ACTIVE | COMMUNITY
Start: 2024-02-01

## 2024-05-30 ENCOUNTER — OFFICE VISIT (OUTPATIENT)
Dept: URBAN - METROPOLITAN AREA CLINIC 112 | Facility: CLINIC | Age: 39
End: 2024-05-30
Payer: COMMERCIAL

## 2024-05-30 ENCOUNTER — TELEPHONE ENCOUNTER (OUTPATIENT)
Dept: URBAN - METROPOLITAN AREA CLINIC 113 | Facility: CLINIC | Age: 39
End: 2024-05-30

## 2024-05-30 VITALS
DIASTOLIC BLOOD PRESSURE: 66 MMHG | TEMPERATURE: 97.2 F | RESPIRATION RATE: 16 BRPM | HEIGHT: 65 IN | HEART RATE: 55 BPM | WEIGHT: 143 LBS | SYSTOLIC BLOOD PRESSURE: 98 MMHG | BODY MASS INDEX: 23.82 KG/M2

## 2024-05-30 DIAGNOSIS — K50.813 CROHN'S DISEASE OF BOTH SMALL AND LARGE INTESTINE WITH FISTULA: ICD-10-CM

## 2024-05-30 PROCEDURE — 96413 CHEMO IV INFUSION 1 HR: CPT | Performed by: INTERNAL MEDICINE

## 2024-05-30 RX ORDER — RISANKIZUMAB-RZAA 360 MG/2.4
AT WEEK 12 WEARABLE INJECTOR SUBCUTANEOUS
Qty: 360 | Refills: 0 | Status: ACTIVE | COMMUNITY
Start: 2024-02-01

## 2024-05-30 RX ORDER — PREDNISONE 5 MG/1
2 TABLETS TABLET ORAL ONCE A DAY
Status: ACTIVE | COMMUNITY

## 2024-05-30 RX ORDER — CHOLESTYRAMINE 4 G/9G
1 PACKET MIXED WITH WATER OR NON-CARBONATED DRINK POWDER, FOR SUSPENSION ORAL TWICE DAILY
Status: ACTIVE | COMMUNITY
Start: 2023-03-03

## 2024-05-30 RX ORDER — RISANKIZUMAB-RZAA 60 MG/ML
AS DIRECTED INJECTION INTRAVENOUS
Qty: 600 | Refills: 0 | Status: ACTIVE | COMMUNITY
Start: 2024-02-01

## 2024-06-17 ENCOUNTER — TELEPHONE ENCOUNTER (OUTPATIENT)
Dept: URBAN - METROPOLITAN AREA CLINIC 113 | Facility: CLINIC | Age: 39
End: 2024-06-17

## 2024-06-17 RX ORDER — RISANKIZUMAB-RZAA 360 MG/2.4
INJECT ONE 360MG CARTRIDGE WEARABLE INJECTOR SUBCUTANEOUS
Refills: 8
Start: 2024-02-01 | End: 2025-11-02

## 2024-06-27 ENCOUNTER — OFFICE VISIT (OUTPATIENT)
Dept: URBAN - METROPOLITAN AREA CLINIC 112 | Facility: CLINIC | Age: 39
End: 2024-06-27
Payer: COMMERCIAL

## 2024-06-27 VITALS
HEART RATE: 56 BPM | DIASTOLIC BLOOD PRESSURE: 75 MMHG | HEIGHT: 65 IN | WEIGHT: 144 LBS | SYSTOLIC BLOOD PRESSURE: 110 MMHG | BODY MASS INDEX: 23.99 KG/M2 | TEMPERATURE: 97.3 F | RESPIRATION RATE: 18 BRPM

## 2024-06-27 DIAGNOSIS — K50.114 CROHN'S COLITIS, WITH ABSCESS: ICD-10-CM

## 2024-06-27 PROCEDURE — 96413 CHEMO IV INFUSION 1 HR: CPT | Performed by: INTERNAL MEDICINE

## 2024-06-27 RX ORDER — RISANKIZUMAB-RZAA 360 MG/2.4
INJECT ONE 360MG CARTRIDGE WEARABLE INJECTOR SUBCUTANEOUS
Refills: 8 | Status: ACTIVE | COMMUNITY
Start: 2024-02-01 | End: 2025-11-02

## 2024-06-27 RX ORDER — PREDNISONE 10 MG/1
3 TABLETS TABLET ORAL
Qty: 90 | Refills: 3 | Status: ACTIVE | COMMUNITY

## 2024-06-27 RX ORDER — RISANKIZUMAB-RZAA 60 MG/ML
AS DIRECTED INJECTION INTRAVENOUS
Qty: 600 | Refills: 0 | Status: ACTIVE | COMMUNITY
Start: 2024-02-01

## 2024-06-27 RX ORDER — CHOLESTYRAMINE 4 G/9G
1 PACKET MIXED WITH WATER OR NON-CARBONATED DRINK POWDER, FOR SUSPENSION ORAL TWICE DAILY
Status: ACTIVE | COMMUNITY
Start: 2023-03-03

## 2024-07-17 ENCOUNTER — OFFICE VISIT (OUTPATIENT)
Dept: URBAN - METROPOLITAN AREA CLINIC 113 | Facility: CLINIC | Age: 39
End: 2024-07-17

## 2024-07-17 ENCOUNTER — DASHBOARD ENCOUNTERS (OUTPATIENT)
Age: 39
End: 2024-07-17

## 2024-07-17 VITALS
BODY MASS INDEX: 23.76 KG/M2 | SYSTOLIC BLOOD PRESSURE: 101 MMHG | HEART RATE: 63 BPM | TEMPERATURE: 97 F | DIASTOLIC BLOOD PRESSURE: 72 MMHG | WEIGHT: 142.6 LBS | RESPIRATION RATE: 18 BRPM | HEIGHT: 65 IN

## 2024-07-17 RX ORDER — PREDNISONE 10 MG/1
3 TABLETS TABLET ORAL
Qty: 90 | Refills: 3 | Status: ACTIVE | COMMUNITY

## 2024-07-17 RX ORDER — CHOLESTYRAMINE 4 G/9G
1 PACKET MIXED WITH WATER OR NON-CARBONATED DRINK POWDER, FOR SUSPENSION ORAL TWICE DAILY
Status: ACTIVE | COMMUNITY
Start: 2023-03-03

## 2024-07-17 RX ORDER — RISANKIZUMAB-RZAA 360 MG/2.4
INJECT ONE 360MG CARTRIDGE WEARABLE INJECTOR SUBCUTANEOUS
Refills: 8 | Status: ACTIVE | COMMUNITY
Start: 2024-02-01 | End: 2025-11-02

## 2024-07-17 RX ORDER — RISANKIZUMAB-RZAA 60 MG/ML
AS DIRECTED INJECTION INTRAVENOUS
Qty: 600 | Refills: 0 | Status: DISCONTINUED | COMMUNITY
Start: 2024-02-01

## 2024-07-17 NOTE — HPI-OTHER HISTORIES
MRI abdomen with and without contrast/MRCP (12/23/2023) showed no choledocholithiasis status postcholecystectomy with expected prominence of the common bile duct, no liver lesions.  Labs (10/31/2023): TB QuantiFERON gold plus negative, normal CBC, normal basic metabolic panel, normal liver function tests.  DEXA scan (7/26/2023) low bone mass consistent with osteopenia, lumbar spine T-score -1.4, Z-score -1.4, left hip T-score -1.3, Z-score -1.1, left femoral neck T-score -2.1, Z-score -1.7.An ustekinumab trough level on 3/16/2023 was 4.9, and antiustekinumab antibodies <10.  Colonoscopy (8/24/23): notable for anal fissure, perianal fistula and perianal skin tags, non-patent end to side ileocolonic anastomosis, characterized by ulceration, edema and severe stenosis s/p biopsies,  altered vascular mucosa in the rectum s/p biopsies. Pathology showed chronic active enterocolitis in the transverse colon/ ileocolonic anastomosis.  EUA with Dr. Bergman on 11/30/22: abscess in the left lateral anterior anal margin, opened and seton revised. No obvious invasion in to the vagina was identified.   MRI of the pelvis w/wo contrast 11/10/22: two left perianal fistulas, one of which appears transsphenteric with seton in place, additional perineal fistula on the left which appears intersphincteric which approaches the vaginal wall without definite extension, possible right fistulous tract, 4.7cm right ovarian cyst with possible mild right hydrosalpinx.  CT chest abdomen and pelvis with contrast on 11/4/2022 revealed a seton catheter along the left gluteal soft tissues.  Just anterior to the seton catheter is an additional fistulous tract beginning approximately 2:00 extending superiorly to the intersphincteric aspect of the left renal stent and also extending laterally to the posterior aspect of the left perineum as well as approximating the left vaginal wall without discrete intravaginal extension. CT of the abdomen and pelvis with contrast 10/8/2022:Left-sided rim-enhancing fluid collection measuring 3.2 x 3.4 x 3.8 cm concerning for perennial abscess. Per the hospital consult notes, last colonoscopy by Dr. Smart in 2021 showed significant colon inflammation. Colonoscopy August 2014 by Dr. Michelle:inactive perianal fistula/tags found on perianal exam, ileocolonic anastomosis with superficial ulceration, normal neoterminal ileum, colon erythema with small, clean-based rectal ulcerations, anal canal ulcer without stenosis.  Biopsies are not readily available within the chart. Colonoscopy (1/23/13) : fair prep, scattered area of mucosa in chandrika- terminal ileum with mild erythema and erosions, mucosal ulceration in proximal colon, distal rectal ulcerations, patent end-to-side ileo-colonic anastomosis in descending colon with erythema and ulceration. Abnormal perianal exam c/w fistula. Low grade active chronic inflammation with focal erosion on small intestinal biopsies. Severe active chronic colitis with ulceration and inflamed granulation tissue on colon and rectal bx.

## 2024-07-17 NOTE — HPI-TODAY'S VISIT:
Ms. Manzano is a 39-year old woman with a history of stricturing ileocolonic Crohn's disease with perianal fistula status post ileocolectomy with ileosigmoid anastomosis (2011) and setons on Stelara 90 mg every 8 weeks presenting for follow up.  She was last in the office on 1/16/2024 for follow-up regarding small bowel and colonic Crohn's disease complicated by rectovaginal fistula on Stelara 90 mg every 8 weeks.  Labs obtained on inadequate ustekinumab trough level and no evidence of neutralizing antibodies.  We discussed changing the Stelara to Skyrizi, and she was given ciprofloxacin and Flagyl in hopes of improving her perianal symptoms.  Regarding her history of elevated liver enzymes, most recent liver enzymes are normal and MRI/MRCP was negative for any significant hepatobiliary pathology.  She is currently having about 4 bowel movements daily with some urgency and no nocturnal bowel movements.  Her perianal symptoms including drainage has been minimal, but she still experiences drainage from her colovaginal fistula.  She denies any fevers or red blood per rectum.  She is weaning down prednisone to 5 mg daily.  The Skyrizi was ordered but has not been approved.

## 2024-07-18 LAB
A/G RATIO: 1.6
ABSOLUTE BASOPHILS: 52
ABSOLUTE EOSINOPHILS: 141
ABSOLUTE LYMPHOCYTES: 1539
ABSOLUTE MONOCYTES: 170
ABSOLUTE NEUTROPHILS: 5498
ALBUMIN: 4.6
ALKALINE PHOSPHATASE: 46
ALT (SGPT): 24
AST (SGOT): 24
BASOPHILS: 0.7
BILIRUBIN, TOTAL: 0.5
BUN/CREATININE RATIO: (no result)
BUN: 9
CALCIUM: 9.7
CARBON DIOXIDE, TOTAL: 30
CHLORIDE: 97
CREATININE: 0.7
EGFR: 113
EOSINOPHILS: 1.9
GLOBULIN, TOTAL: 2.8
GLUCOSE: 96
HEMATOCRIT: 44.1
HEMOGLOBIN: 14.9
LYMPHOCYTES: 20.8
MCH: 30.3
MCHC: 33.8
MCV: 89.6
MONOCYTES: 2.3
MPV: 9
NEUTROPHILS: 74.3
PLATELET COUNT: 257
POTASSIUM: 4.1
PROTEIN, TOTAL: 7.4
RDW: 11.6
RED BLOOD CELL COUNT: 4.92
SODIUM: 138
WHITE BLOOD CELL COUNT: 7.4

## 2024-10-01 ENCOUNTER — OFFICE VISIT (OUTPATIENT)
Dept: URBAN - METROPOLITAN AREA CLINIC 113 | Facility: CLINIC | Age: 39
End: 2024-10-01
Payer: COMMERCIAL

## 2024-10-01 VITALS
WEIGHT: 140.4 LBS | HEART RATE: 64 BPM | RESPIRATION RATE: 12 BRPM | BODY MASS INDEX: 23.39 KG/M2 | SYSTOLIC BLOOD PRESSURE: 109 MMHG | TEMPERATURE: 97.7 F | HEIGHT: 65 IN | DIASTOLIC BLOOD PRESSURE: 70 MMHG

## 2024-10-01 DIAGNOSIS — K90.89 BILE SALT-INDUCED DIARRHEA: ICD-10-CM

## 2024-10-01 DIAGNOSIS — N82.3 RECTOVAGINAL FISTULA: ICD-10-CM

## 2024-10-01 DIAGNOSIS — K50.813 CROHN'S DISEASE OF BOTH SMALL AND LARGE INTESTINE WITH FISTULA: ICD-10-CM

## 2024-10-01 DIAGNOSIS — Z79.899 HIGH RISK MEDICATION USE: ICD-10-CM

## 2024-10-01 PROCEDURE — 99214 OFFICE O/P EST MOD 30 MIN: CPT | Performed by: INTERNAL MEDICINE

## 2024-10-01 RX ORDER — PREDNISONE 5 MG/1
1 TABLET TABLET ORAL ONCE A DAY
Qty: 30 | Refills: 3 | Status: ACTIVE | COMMUNITY

## 2024-10-01 RX ORDER — RISANKIZUMAB-RZAA 360 MG/2.4
INJECT ONE 360MG CARTRIDGE WEARABLE INJECTOR SUBCUTANEOUS
OUTPATIENT
Start: 2024-02-01

## 2024-10-01 RX ORDER — CHOLESTYRAMINE 4 G/9G
1 PACKET MIXED WITH WATER OR NON-CARBONATED DRINK POWDER, FOR SUSPENSION ORAL TWICE DAILY
Status: ACTIVE | COMMUNITY
Start: 2023-03-03

## 2024-10-01 RX ORDER — PREDNISONE 5 MG/1
1 TABLET TABLET ORAL ONCE A DAY
OUTPATIENT

## 2024-10-01 RX ORDER — RISANKIZUMAB-RZAA 360 MG/2.4
INJECT ONE 360MG CARTRIDGE WEARABLE INJECTOR SUBCUTANEOUS
Refills: 8 | Status: ACTIVE | COMMUNITY
Start: 2024-02-01 | End: 2025-11-02

## 2024-10-01 RX ORDER — CHOLESTYRAMINE 4 G/9G
1 PACKET MIXED WITH WATER OR NON-CARBONATED DRINK POWDER, FOR SUSPENSION ORAL TWICE DAILY
OUTPATIENT
Start: 2023-03-03

## 2024-10-01 NOTE — HPI-TODAY'S VISIT:
Ms. Manzano is a 39-year old woman with a history of stricturing ileocolonic Crohn's disease with perianal fistula status post ileocolectomy with ileosigmoid anastomosis (2011) and setons on Skyrizi presenting for follow up.  She was last in the office on 7/17/2024 for follow-up regarding her small bowel colonic Crohn's disease complicated by perianal and colovaginal fistula on Skyrizi and prednisone, and bile salt diarrhea.  She was recommended to taper prednisone from 5 mg as able labs.  She continued cholestyramine for the diarrhea.  She reports overall she is still having some colovaginal drainage but otherwise her perianal symptoms have improved.  She is currently taking prednisone 2.5 mg every other day and Skyrizi 360 mg on body injector every 8 weeks.  She notices that if she stops the prednisone she will develop increasing amounts of diarrhea and bodyaches.  She is typically having 3-4 bowel movements daily with good control without any blood or mucus in her stool.  No nocturnal bowel movements.

## 2024-11-27 ENCOUNTER — ERX REFILL RESPONSE (OUTPATIENT)
Dept: URBAN - METROPOLITAN AREA CLINIC 113 | Facility: CLINIC | Age: 39
End: 2024-11-27

## 2024-11-27 RX ORDER — CHOLESTYRAMINE 4 G/9G
1 PACKET MIXED WITH WATER OR NON-CARBONATED DRINK POWDER, FOR SUSPENSION ORAL TWICE DAILY
Qty: 60 | Refills: 11 | OUTPATIENT

## 2024-11-27 RX ORDER — CHOLESTYRAMINE 4 G/9G
1 PACKET MIXED WITH WATER OR NON-CARBONATED DRINK POWDER, FOR SUSPENSION ORAL TWICE DAILY
OUTPATIENT

## 2024-12-13 ENCOUNTER — TELEPHONE ENCOUNTER (OUTPATIENT)
Dept: URBAN - METROPOLITAN AREA CLINIC 113 | Facility: CLINIC | Age: 39
End: 2024-12-13

## 2025-01-10 ENCOUNTER — TELEPHONE ENCOUNTER (OUTPATIENT)
Dept: URBAN - METROPOLITAN AREA CLINIC 113 | Facility: CLINIC | Age: 40
End: 2025-01-10

## 2025-02-10 ENCOUNTER — TELEPHONE ENCOUNTER (OUTPATIENT)
Dept: URBAN - METROPOLITAN AREA CLINIC 113 | Facility: CLINIC | Age: 40
End: 2025-02-10

## 2025-02-10 RX ORDER — SODIUM, POTASSIUM,MAG SULFATES 17.5-3.13G
177MLBOTTLE @ 5PM DAY BEFORE AND 177 ML IS 6 HOURS PRIOR TO PROCEDURE SOLUTION, RECONSTITUTED, ORAL ORAL
Qty: 354 ML | Refills: 0 | OUTPATIENT
Start: 2025-02-13 | End: 2025-02-15

## 2025-02-15 ENCOUNTER — TELEPHONE ENCOUNTER (OUTPATIENT)
Dept: URBAN - METROPOLITAN AREA CLINIC 113 | Facility: CLINIC | Age: 40
End: 2025-02-15

## 2025-02-24 ENCOUNTER — CLAIMS CREATED FROM THE CLAIM WINDOW (OUTPATIENT)
Dept: URBAN - METROPOLITAN AREA CLINIC 4 | Facility: CLINIC | Age: 40
End: 2025-02-24
Payer: COMMERCIAL

## 2025-02-24 ENCOUNTER — OFFICE VISIT (OUTPATIENT)
Dept: URBAN - METROPOLITAN AREA SURGERY CENTER 25 | Facility: SURGERY CENTER | Age: 40
End: 2025-02-24

## 2025-02-24 ENCOUNTER — CLAIMS CREATED FROM THE CLAIM WINDOW (OUTPATIENT)
Dept: URBAN - METROPOLITAN AREA SURGERY CENTER 25 | Facility: SURGERY CENTER | Age: 40
End: 2025-02-24
Payer: COMMERCIAL

## 2025-02-24 DIAGNOSIS — K63.3 ULCER OF INTESTINE: ICD-10-CM

## 2025-02-24 PROCEDURE — 88305 TISSUE EXAM BY PATHOLOGIST: CPT | Performed by: PATHOLOGY

## 2025-02-24 PROCEDURE — 88341 IMHCHEM/IMCYTCHM EA ADD ANTB: CPT | Performed by: PATHOLOGY

## 2025-02-24 PROCEDURE — 88342 IMHCHEM/IMCYTCHM 1ST ANTB: CPT | Performed by: PATHOLOGY

## 2025-02-24 PROCEDURE — 00811 ANES LWR INTST NDSC NOS: CPT | Performed by: NURSE ANESTHETIST, CERTIFIED REGISTERED

## 2025-02-24 RX ORDER — RISANKIZUMAB-RZAA 360 MG/2.4
INJECT ONE 360MG CARTRIDGE WEARABLE INJECTOR SUBCUTANEOUS
Status: ACTIVE | COMMUNITY
Start: 2024-02-01

## 2025-02-24 RX ORDER — PREDNISONE 5 MG/1
1 TABLET TABLET ORAL ONCE A DAY
Status: ACTIVE | COMMUNITY

## 2025-02-24 RX ORDER — CHOLESTYRAMINE 4 G/9G
1 PACKET MIXED WITH WATER OR NON-CARBONATED DRINK POWDER, FOR SUSPENSION ORAL TWICE DAILY
Qty: 60 | Refills: 11 | Status: ACTIVE | COMMUNITY

## 2025-04-22 ENCOUNTER — OFFICE VISIT (OUTPATIENT)
Dept: URBAN - METROPOLITAN AREA CLINIC 107 | Facility: CLINIC | Age: 40
End: 2025-04-22

## 2025-04-22 RX ORDER — PREDNISONE 5 MG/1
1 TABLET TABLET ORAL ONCE A DAY
Status: ACTIVE | COMMUNITY

## 2025-04-22 RX ORDER — CHOLESTYRAMINE 4 G/9G
1 PACKET MIXED WITH WATER OR NON-CARBONATED DRINK POWDER, FOR SUSPENSION ORAL TWICE DAILY
Qty: 60 | Refills: 11 | Status: ACTIVE | COMMUNITY

## 2025-04-22 RX ORDER — RISANKIZUMAB-RZAA 360 MG/2.4
INJECT ONE 360MG CARTRIDGE WEARABLE INJECTOR SUBCUTANEOUS
Status: ACTIVE | COMMUNITY
Start: 2024-02-01

## 2025-04-22 NOTE — HPI-TODAY'S VISIT:
Problem: VTE, Risk for  Goal: # No s/s of VTE  Outcome: Outcome Met, Continue evaluating goal progress toward completion  Goal: # Verbalizes understanding of VTE risk factors and prevention  Description: Document education using the patient education activity.   Outcome: Outcome Met, Continue evaluating goal progress toward completion  Goal: Demonstrates ability to administer injectable anticoagulants if ordered for d/c  Description: Document education using the patient education activity.  Outcome: Outcome Met, Continue evaluating goal progress toward completion      Ms. Manzano is a 40-year old woman with a history of stricturing ileocolonic Crohn's disease with perianal fistula status post ileocolectomy with ileosigmoid anastomosis (2011) and setons on Skhood presenting for follow up.

## 2025-07-08 ENCOUNTER — TELEPHONE ENCOUNTER (OUTPATIENT)
Dept: URBAN - METROPOLITAN AREA CLINIC 113 | Facility: CLINIC | Age: 40
End: 2025-07-08

## 2025-07-08 RX ORDER — METRONIDAZOLE 500 MG/1
1 TABLET TABLET ORAL THREE TIMES A DAY
Qty: 42 TABLET | Refills: 1 | OUTPATIENT
Start: 2025-07-08 | End: 2025-08-05

## 2025-07-08 RX ORDER — CIPROFLOXACIN 500 MG/1
1 TABLET TABLET, FILM COATED ORAL TWICE A DAY
Qty: 28 TABLET | Refills: 1 | OUTPATIENT
Start: 2025-07-08 | End: 2025-08-05

## 2025-07-20 ENCOUNTER — ERX REFILL RESPONSE (OUTPATIENT)
Dept: URBAN - METROPOLITAN AREA CLINIC 113 | Facility: CLINIC | Age: 40
End: 2025-07-20

## 2025-07-20 RX ORDER — PREDNISONE 5 MG/1
1 TABLET TABLET ORAL ONCE A DAY
OUTPATIENT

## 2025-07-20 RX ORDER — PREDNISONE 10 MG/1
TAKE 3 TABLETS BY MOUTH ONCE DAILY AS DIRECTED (WILL BE ON A TAPER) TAPER AS DIRECTED FOR 30 DAYS TABLET ORAL
Qty: 90 TABLET | Refills: 1 | OUTPATIENT

## 2025-07-24 ENCOUNTER — OFFICE VISIT (OUTPATIENT)
Dept: URBAN - METROPOLITAN AREA CLINIC 107 | Facility: CLINIC | Age: 40
End: 2025-07-24

## 2025-07-24 RX ORDER — PREDNISONE 10 MG/1
TAKE 3 TABLETS BY MOUTH ONCE DAILY AS DIRECTED (WILL BE ON A TAPER) TAPER AS DIRECTED FOR 30 DAYS TABLET ORAL
Qty: 90 TABLET | Refills: 1 | Status: ACTIVE | COMMUNITY

## 2025-07-24 RX ORDER — METRONIDAZOLE 500 MG/1
1 TABLET TABLET ORAL THREE TIMES A DAY
Qty: 42 TABLET | Refills: 3
Start: 2025-07-08 | End: 2025-09-18

## 2025-07-24 RX ORDER — METRONIDAZOLE 500 MG/1
1 TABLET TABLET ORAL THREE TIMES A DAY
Qty: 42 TABLET | Refills: 1 | Status: ACTIVE | COMMUNITY
Start: 2025-07-08 | End: 2025-08-05

## 2025-07-24 RX ORDER — CHOLESTYRAMINE 4 G/9G
1 PACKET MIXED WITH WATER OR NON-CARBONATED DRINK POWDER, FOR SUSPENSION ORAL TWICE DAILY
Status: ACTIVE | COMMUNITY

## 2025-07-24 RX ORDER — CIPROFLOXACIN 500 MG/1
1 TABLET TABLET, FILM COATED ORAL TWICE A DAY
Qty: 28 TABLET | Refills: 1 | Status: ACTIVE | COMMUNITY
Start: 2025-07-08 | End: 2025-08-05

## 2025-07-24 RX ORDER — COLESTIPOL HYDROCHLORIDE 1 G/1
1 TABLET TABLET, FILM COATED ORAL TWICE A DAY
Qty: 60 | Refills: 3 | OUTPATIENT
Start: 2025-07-24

## 2025-07-24 RX ORDER — CIPROFLOXACIN 500 MG/1
1 TABLET TABLET, FILM COATED ORAL TWICE A DAY
Qty: 28 TABLET | Refills: 3
Start: 2025-07-08 | End: 2025-09-18

## 2025-07-24 RX ORDER — PREDNISONE 10 MG/1
TAKE 3 TABLETS BY MOUTH ONCE DAILY AS DIRECTED (WILL BE ON A TAPER) TAPER AS DIRECTED FOR 30 DAYS TABLET ORAL
OUTPATIENT

## 2025-07-24 RX ORDER — CHOLESTYRAMINE 4 G/9G
1 PACKET MIXED WITH WATER OR NON-CARBONATED DRINK POWDER, FOR SUSPENSION ORAL TWICE DAILY
OUTPATIENT

## 2025-07-24 RX ORDER — RISANKIZUMAB-RZAA 360 MG/2.4
INJECT ONE 360MG CARTRIDGE WEARABLE INJECTOR SUBCUTANEOUS
Status: ACTIVE | COMMUNITY
Start: 2024-02-01

## 2025-07-24 RX ORDER — RISANKIZUMAB-RZAA 360 MG/2.4
INJECT ONE 360MG CARTRIDGE WEARABLE INJECTOR SUBCUTANEOUS
Qty: 1 | Refills: 5
Start: 2024-02-01 | End: 2025-07-30

## 2025-07-24 NOTE — HPI-TODAY'S VISIT:
Ms. Manzano is a 40-year old woman with a history of stricturing ileocolonic Crohn's disease with perianal fistula status post ileocolectomy with ileosigmoid anastomosis (2011) and setons on Skhood presenting for follow up.

## 2025-07-24 NOTE — HPI-OTHER HISTORIES
Colonoscopy (2/24/2025): anal canal stenosis and perianal skin tags but no active fistula/Crohn's disease, nonpatent end-to-side ileocolonic anastomosis characterized by moderate to severe stenosis, ulceration, edema and erythema status post biopsy showing chronic active enterocolitis with associated ulcer and granulation tissue most consistent with anastomosis related changes without dysplasia, the stenosis could not be traversed, otherwise, normal-appearing colon. MRI abdomen with and without contrast/MRCP (12/23/2023) showed no choledocholithiasis status postcholecystectomy with expected prominence of the common bile duct, no liver lesions. DEXA scan (7/26/2023) low bone mass consistent with osteopenia, lumbar spine T-score -1.4, Z-score -1.4, left hip T-score -1.3, Z-score -1.1, left femoral neck T-score -2.1, Z-score -1.7.An ustekinumab trough level on 3/16/2023 was 4.9, and antiustekinumab antibodies < 10. Colonoscopy (8/24/23): notable for anal fissure, perianal fistula and perianal skin tags, non-patent end to side ileocolonic anastomosis, characterized by ulceration, edema and severe stenosis s/p biopsies,  altered vascular mucosa in the rectum s/p biopsies. Pathology showed chronic active enterocolitis in the transverse colon/ ileocolonic anastomosis. EUA with Dr. Bergman on 11/30/22: abscess in the left lateral anterior anal margin, opened and seton revised. No obvious invasion in to the vagina was identified. MRI of the pelvis w/wo contrast 11/10/22: two left perianal fistulas, one of which appears transsphenteric with seton in place, additional perineal fistula on the left which appears intersphincteric which approaches the vaginal wall without definite extension, possible right fistulous tract, 4.7cm right ovarian cyst with possible mild right hydrosalpinx.  CT chest abdomen and pelvis with contrast on 11/4/2022 revealed a seton catheter along the left gluteal soft tissues.  Just anterior to the seton catheter is an additional fistulous tract beginning approximately 2:00 extending superiorly to the intersphincteric aspect of the left renal stent and also extending laterally to the posterior aspect of the left perineum as well as approximating the left vaginal wall without discrete intravaginal extension. CT of the abdomen and pelvis with contrast 10/8/2022:Left-sided rim-enhancing fluid collection measuring 3.2 x 3.4 x 3.8 cm concerning for perennial abscess. Per the hospital consult notes, last colonoscopy by Dr. Smart in 2021 showed significant colon inflammation. Colonoscopy August 2014 by Dr. Michelle:inactive perianal fistula/tags found on perianal exam, ileocolonic anastomosis with superficial ulceration, normal neoterminal ileum, colon erythema with small, clean-based rectal ulcerations, anal canal ulcer without stenosis.  Biopsies are not readily available within the chart. Colonoscopy (1/23/13) : fair prep, scattered area of mucosa in chandrika- terminal ileum with mild erythema and erosions, mucosal ulceration in proximal colon, distal rectal ulcerations, patent end-to-side ileo-colonic anastomosis in descending colon with erythema and ulceration. Abnormal perianal exam c/w fistula. Low grade active chronic inflammation with focal erosion on small intestinal biopsies. Severe active chronic colitis with ulceration and inflamed granulation tissue on colon and rectal bx.